# Patient Record
(demographics unavailable — no encounter records)

---

## 2024-10-07 NOTE — REASON FOR VISIT
[Initial Visit] : an initial visit for [Foot Pain] : foot pain [R/O Foot Fracture] : r/o foot fracture

## 2024-10-10 NOTE — PHYSICAL EXAM
[2+] : left foot dorsalis pedis 2+ [Skin Turgor] : normal skin turgor [Skin Lesions] : no skin lesions [Sensation] : the sensory exam was normal to light touch and pinprick [No Focal Deficits] : no focal deficits [Deep Tendon Reflexes (DTR)] : deep tendon reflexes were 2+ and symmetric [Motor Exam] : the motor exam was normal [General Appearance - Alert] : alert [Oriented To Time, Place, And Person] : oriented to person, place, and time [Ankle Swelling (On Exam)] : not present [Varicose Veins Of Lower Extremities] : not present [] : not present [Delayed in the Right Toes] : capillary refills normal in right toes [Delayed in the Left Toes] : capillary refills normal in the left toes [de-identified] : Pain at the left hallux with attempted ROM and with palpation to the 1st MPJ.   [Foot Ulcer] : no foot ulcer [Skin Induration] : no skin induration [FreeTextEntry1] : Ecchymosis, left 1st MPJ.

## 2024-10-10 NOTE — HISTORY OF PRESENT ILLNESS
[FreeTextEntry1] : Patient presents today after sustaining an injury while at work on 10/05/24.  Patient was going downstairs and tripped and fell down the last step and injured her left foot.  She was seen at [facility].  She did not bring those x-rays with her.  She has ecchymosis at the base of the hallux and medially with swelling.

## 2024-10-10 NOTE — PHYSICAL EXAM
[2+] : left foot dorsalis pedis 2+ [Skin Turgor] : normal skin turgor [Skin Lesions] : no skin lesions [Sensation] : the sensory exam was normal to light touch and pinprick [No Focal Deficits] : no focal deficits [Deep Tendon Reflexes (DTR)] : deep tendon reflexes were 2+ and symmetric [Motor Exam] : the motor exam was normal [General Appearance - Alert] : alert [Oriented To Time, Place, And Person] : oriented to person, place, and time [Varicose Veins Of Lower Extremities] : not present [Ankle Swelling (On Exam)] : not present [] : not present [Delayed in the Right Toes] : capillary refills normal in right toes [Delayed in the Left Toes] : capillary refills normal in the left toes [de-identified] : Pain at the left hallux with attempted ROM and with palpation to the 1st MPJ.   [Foot Ulcer] : no foot ulcer [Skin Induration] : no skin induration [FreeTextEntry1] : Ecchymosis, left 1st MPJ.

## 2024-10-10 NOTE — ASSESSMENT
[FreeTextEntry1] : Impression: Non-displaced fracture of the distal phalanx, left hallux (S92.425A).  Workers' Comp. injury (Z02.6).  Treatment: Patient was placed into a compression type wrap and surgical shoe.  Limit activities.   This is a work-related injury causally related and she is totally disabled from work at this time.   Will reevaluate in 3 weeks.  Any questions or problems, patient is to contact the office.

## 2024-10-10 NOTE — PHYSICAL EXAM
[2+] : left foot dorsalis pedis 2+ [Skin Turgor] : normal skin turgor [Skin Lesions] : no skin lesions [Sensation] : the sensory exam was normal to light touch and pinprick [No Focal Deficits] : no focal deficits [Deep Tendon Reflexes (DTR)] : deep tendon reflexes were 2+ and symmetric [Motor Exam] : the motor exam was normal [General Appearance - Alert] : alert [Oriented To Time, Place, And Person] : oriented to person, place, and time [Varicose Veins Of Lower Extremities] : not present [Ankle Swelling (On Exam)] : not present [] : not present [Delayed in the Right Toes] : capillary refills normal in right toes [Delayed in the Left Toes] : capillary refills normal in the left toes [de-identified] : Pain at the left hallux with attempted ROM and with palpation to the 1st MPJ.   [Foot Ulcer] : no foot ulcer [Skin Induration] : no skin induration [FreeTextEntry1] : Ecchymosis, left 1st MPJ.

## 2024-10-10 NOTE — PROCEDURE
[FreeTextEntry1] : X-rays were taken to assess the fracture. (3 views - left foot) There is an avulsion fracture at the base of the distal phalanx, laterally; it is intra-articular but not displaced.

## 2024-10-23 NOTE — ASSESSMENT
[FreeTextEntry1] : Impression: Non-displaced fracture of the distal phalanx, left hallux (S92.358A).  Workers' Comp. injury (Z02.6).  Treatment:  This is a work-related injury causally related and she is totally disabled from work at this time.  Patient has not reached maximum medical improvement. Patient feels that working is hindering her healing at this time.  Patient is to continue ladan splinting the toe.  She is to try to transition into a shoe.  I feel that she should reduce her work.  Explained to her that this fracture may not completely heal radiographically because of the fluid seeping into it but hopefully, the pain will not hinder her job. Will reevaluate in 3 weeks.  Any questions or problems, patient is to contact the office.

## 2024-10-23 NOTE — PHYSICAL EXAM
[2+] : left foot dorsalis pedis 2+ [Skin Turgor] : normal skin turgor [Skin Lesions] : no skin lesions [Sensation] : the sensory exam was normal to light touch and pinprick [No Focal Deficits] : no focal deficits [Deep Tendon Reflexes (DTR)] : deep tendon reflexes were 2+ and symmetric [Motor Exam] : the motor exam was normal [General Appearance - Alert] : alert [Oriented To Time, Place, And Person] : oriented to person, place, and time [Ankle Swelling (On Exam)] : not present [Varicose Veins Of Lower Extremities] : not present [] : not present [Delayed in the Right Toes] : capillary refills normal in right toes [Delayed in the Left Toes] : capillary refills normal in the left toes [de-identified] : Pain at the left hallux with attempted ROM and with palpation to the 1st MPJ.   [Foot Ulcer] : no foot ulcer [Skin Induration] : no skin induration [FreeTextEntry1] : Reduced swelling is noted.

## 2024-10-23 NOTE — ASSESSMENT
[FreeTextEntry1] : Impression: Non-displaced fracture of the distal phalanx, left hallux (S92.674A).  Workers' Comp. injury (Z02.6).  Treatment:  This is a work-related injury causally related and she is totally disabled from work at this time.  Patient has not reached maximum medical improvement. Patient feels that working is hindering her healing at this time.  Patient is to continue ladan splinting the toe.  She is to try to transition into a shoe.  I feel that she should reduce her work.  Explained to her that this fracture may not completely heal radiographically because of the fluid seeping into it but hopefully, the pain will not hinder her job. Will reevaluate in 3 weeks.  Any questions or problems, patient is to contact the office.

## 2024-10-23 NOTE — PHYSICAL EXAM
[2+] : left foot dorsalis pedis 2+ [Skin Turgor] : normal skin turgor [Skin Lesions] : no skin lesions [Sensation] : the sensory exam was normal to light touch and pinprick [No Focal Deficits] : no focal deficits [Deep Tendon Reflexes (DTR)] : deep tendon reflexes were 2+ and symmetric [Motor Exam] : the motor exam was normal [General Appearance - Alert] : alert [Oriented To Time, Place, And Person] : oriented to person, place, and time [Ankle Swelling (On Exam)] : not present [Varicose Veins Of Lower Extremities] : not present [] : not present [Delayed in the Right Toes] : capillary refills normal in right toes [Delayed in the Left Toes] : capillary refills normal in the left toes [de-identified] : Pain at the left hallux with attempted ROM and with palpation to the 1st MPJ.   [Foot Ulcer] : no foot ulcer [Skin Induration] : no skin induration [FreeTextEntry1] : Reduced swelling is noted.

## 2024-10-23 NOTE — HISTORY OF PRESENT ILLNESS
[FreeTextEntry1] : Patient presents today for reevaluation of left hallux fracture.  Patient is improved but is working 5 days a week and gets pain and swelling at the end of the day.  No other changes to her medical status.

## 2024-10-23 NOTE — ASSESSMENT
[FreeTextEntry1] : Impression: Non-displaced fracture of the distal phalanx, left hallux (S92.650A).  Workers' Comp. injury (Z02.6).  Treatment:  This is a work-related injury causally related and she is totally disabled from work at this time.  Patient has not reached maximum medical improvement. Patient feels that working is hindering her healing at this time.  Patient is to continue ladan splinting the toe.  She is to try to transition into a shoe.  I feel that she should reduce her work.  Explained to her that this fracture may not completely heal radiographically because of the fluid seeping into it but hopefully, the pain will not hinder her job. Will reevaluate in 3 weeks.  Any questions or problems, patient is to contact the office.

## 2024-10-23 NOTE — PHYSICAL EXAM
[2+] : left foot dorsalis pedis 2+ [Skin Turgor] : normal skin turgor [Skin Lesions] : no skin lesions [Sensation] : the sensory exam was normal to light touch and pinprick [No Focal Deficits] : no focal deficits [Deep Tendon Reflexes (DTR)] : deep tendon reflexes were 2+ and symmetric [Motor Exam] : the motor exam was normal [General Appearance - Alert] : alert [Oriented To Time, Place, And Person] : oriented to person, place, and time [Ankle Swelling (On Exam)] : not present [Varicose Veins Of Lower Extremities] : not present [] : not present [Delayed in the Right Toes] : capillary refills normal in right toes [Delayed in the Left Toes] : capillary refills normal in the left toes [de-identified] : Pain at the left hallux with attempted ROM and with palpation to the 1st MPJ.   [Foot Ulcer] : no foot ulcer [Skin Induration] : no skin induration [FreeTextEntry1] : Reduced swelling is noted.

## 2024-11-11 NOTE — PHYSICAL EXAM
[2+] : left foot dorsalis pedis 2+ [Sensation] : the sensory exam was normal to light touch and pinprick [No Focal Deficits] : no focal deficits [Deep Tendon Reflexes (DTR)] : deep tendon reflexes were 2+ and symmetric [Motor Exam] : the motor exam was normal [Ankle Swelling (On Exam)] : not present [Varicose Veins Of Lower Extremities] : not present [] : not present [Delayed in the Right Toes] : capillary refills normal in right toes [Delayed in the Left Toes] : capillary refills normal in the left toes [FreeTextEntry1] : There is good clinical signs of healing.

## 2024-11-11 NOTE — HISTORY OF PRESENT ILLNESS
[FreeTextEntry1] : Patient presents today for a work-related injury of the great toe. She had a fracture that is intra-articular. She is doing much better and increasing her activity. She has mild pain.

## 2024-11-11 NOTE — PROCEDURE
[FreeTextEntry1] : X-rays taken to assess healing. X-ray Report: (Left foot - 2 views) X-rays demonstrate an avulsion fracture medially intra-articular at the IPJ that is healing in.

## 2024-11-11 NOTE — ASSESSMENT
[FreeTextEntry1] : Impression: Healing fracture, left hallux. Pain.  Treatment:  I gave her toe splints to use for the next 2 weeks. She may resume full activities in 2 weeks. I discharged her from treatment. She will follow-up in the office as needed.